# Patient Record
Sex: MALE | Race: WHITE | HISPANIC OR LATINO
[De-identification: names, ages, dates, MRNs, and addresses within clinical notes are randomized per-mention and may not be internally consistent; named-entity substitution may affect disease eponyms.]

---

## 2019-10-07 ENCOUNTER — NON-APPOINTMENT (OUTPATIENT)
Age: 74
End: 2019-10-07

## 2019-10-08 ENCOUNTER — APPOINTMENT (OUTPATIENT)
Dept: OPHTHALMOLOGY | Facility: CLINIC | Age: 74
End: 2019-10-08
Payer: MEDICARE

## 2019-10-08 PROCEDURE — 92004 COMPRE OPH EXAM NEW PT 1/>: CPT

## 2020-08-04 ENCOUNTER — APPOINTMENT (OUTPATIENT)
Dept: OPHTHALMOLOGY | Facility: CLINIC | Age: 75
End: 2020-08-04

## 2022-12-27 ENCOUNTER — NON-APPOINTMENT (OUTPATIENT)
Age: 77
End: 2022-12-27

## 2022-12-27 ENCOUNTER — APPOINTMENT (OUTPATIENT)
Dept: ORTHOPEDIC SURGERY | Facility: CLINIC | Age: 77
End: 2022-12-27

## 2022-12-27 VITALS — WEIGHT: 208 LBS | BODY MASS INDEX: 29.78 KG/M2 | HEIGHT: 70 IN

## 2022-12-27 DIAGNOSIS — M25.561 PAIN IN RIGHT KNEE: ICD-10-CM

## 2022-12-27 PROBLEM — Z00.00 ENCOUNTER FOR PREVENTIVE HEALTH EXAMINATION: Status: ACTIVE | Noted: 2022-12-27

## 2022-12-27 PROCEDURE — 73564 X-RAY EXAM KNEE 4 OR MORE: CPT | Mod: RT

## 2022-12-27 PROCEDURE — 99204 OFFICE O/P NEW MOD 45 MIN: CPT | Mod: 25

## 2022-12-27 PROCEDURE — 20610 DRAIN/INJ JOINT/BURSA W/O US: CPT | Mod: RT

## 2022-12-27 RX ORDER — HYALURONATE SODIUM 20 MG/2 ML
20 SYRINGE (ML) INTRAARTICULAR
Qty: 1 | Refills: 0 | Status: ACTIVE | COMMUNITY
Start: 2022-12-27

## 2022-12-27 NOTE — PROCEDURE
[de-identified] : Injection: Right knee joint.\par Indication: Degenerative osteoarthritis\par \par A discussion was had with the patient regarding this procedure and all questions were answered. All risks, benefits and alternatives were discussed. These included but were not limited to bleeding, infection, and allergic reaction.  Also discussed with the patient risk of transient hyperglycemia due to the patient's diabetes.  Alcohol was used to clean the skin, and betadine was used to sterilize and prep the area in the lateral aspect of the right knee. Ethyl chloride spray was then used as a topical anesthetic. A 22-gauge needle was used to inject 4cc of 2% lidocaine and 1cc of 40 mg Depo-Medrol into the knee. A sterile bandage was then applied. The patient tolerated the procedure well and there were no complications.\par

## 2022-12-27 NOTE — DISCUSSION/SUMMARY
[de-identified] : 77-year-old male with severe degenerative joint arthritis of the right knee.\par I discussed the treatment of degenerative arthritis with the patient at length today. I described the spectrum of treatment from nonoperative modalities to total joint arthroplasty. Noninvasive and nonoperative treatment modalities include weight reduction, activity modification with low impact exercise, PRN use of acetaminophen or anti-inflammatory medication if tolerated, glucosamine/chondroitin supplements, and physical therapy. Further treatments can include corticosteroid injection and the use of hyaluronic acid injections. Definitive treatment can certainly include total joint arthroplasty also. The risks and benefits of each treatment options was discussed and all questions were answered. \par Cortisone injections performed today as described in the note.  He was given a prescription for physical therapy.  We will obtain authorization for Euflexxa injections.  He will follow-up for the gel injections.\par

## 2022-12-27 NOTE — HISTORY OF PRESENT ILLNESS
[de-identified] : 77-year-old [place]ian-speaking male presents with 2-3 weeks of worsening right knee pain, not associated with specific injury or event.  He notices pain with increased walking along the medial and lateral aspects of his knee.  Has associated swelling and recent loss of motion.  Also sensation of buckling episodes due to the swelling.  Denies locking or clicking.  No issues with his right knee in the past and he is typically active with lots of walking during the day.  His son has noticed increased stiffness and limping.  They have applied ice and he has taken some NSAIDs with partial relief.  No history of right knee surgery or injections.  He underwent left knee arthroscopy in approximately 2015.  Denies distal numbness or tingling.\par Past medical history: Prostate cancer currently undergoing chemotherapy, hypertension, diabetes

## 2022-12-27 NOTE — PHYSICAL EXAM
[de-identified] : General: No acute distress\par Mental: Alert and oriented x3\par Eyes: Conjunctivitis not seen\par Chest: Symmetric chest rise, no audible wheezing\par Skin: Bilateral lower extremities absent from rashes and ulcers\par Abdomen: No distention\par \par Right knee:\par Skin: Clean, dry, intact \par Inspection: Slight varus malalignment, moderate effusion and soft tissue edema\par Tenderness: + MJLT. + LJLT. No tenderness over the medial and lateral patella facets. No ttp medial/lateral epicondyle, patella tendon, tibial tubercle, pes anserinus, or proximal fibula. \par ROM: 20 to 110° pain with deep flexion and crepitus\par Stability: Stable to varus and valgus, negative lachman. Negative anterior/posterior drawer. \par Additional tests: Negative McMurrays test, negative Steinmann, Negative patellar grind test.  \par Strength: 5/5 Q/H/TA/GS/EHL, no atrophy \par Neuro: Sensation intact to light touch throughout in dp/sp/tib/liang/saph nerve distributions\par Pulses: 2+ DP/PT pulses.\par  [de-identified] : The following radiographs were ordered and read by me during this patients visit. I reviewed each radiograph in detail with the patient and discussed the findings as highlighted below.\par \par Right knee x-rays demonstrate severe degenerative findings with narrowing of the medial lateral joint space, marginal osteophytes with subchondral sclerosis, chondrocalcinosis, neutral alignment.  Patella at appropriate height and position with significant narrowing of the patellofemoral joint space.  Kellgren-Jared 3

## 2023-01-09 ENCOUNTER — APPOINTMENT (OUTPATIENT)
Dept: ORTHOPEDIC SURGERY | Facility: CLINIC | Age: 78
End: 2023-01-09
Payer: MEDICARE

## 2023-01-09 VITALS — BODY MASS INDEX: 29.78 KG/M2 | HEIGHT: 70 IN | WEIGHT: 208 LBS

## 2023-01-09 DIAGNOSIS — M17.11 UNILATERAL PRIMARY OSTEOARTHRITIS, RIGHT KNEE: ICD-10-CM

## 2023-01-09 PROCEDURE — 99213 OFFICE O/P EST LOW 20 MIN: CPT

## 2023-01-09 NOTE — PHYSICAL EXAM
[de-identified] : General: No acute distress\par Mental: Alert and oriented x3\par Eyes: Conjunctivitis not seen\par Chest: Symmetric chest rise, no audible wheezing\par Skin: Bilateral lower extremities absent from rashes and ulcers\par Abdomen: No distention\par \par Right knee:\par Skin: Clean, dry, intact \par Inspection: Slight varus malalignment, moderate effusion and soft tissue edema\par Tenderness: + MJLT. + LJLT. No tenderness over the medial and lateral patella facets. No ttp medial/lateral epicondyle, patella tendon, tibial tubercle, pes anserinus, or proximal fibula. \par ROM: 10 to 120°, crepitus through the arc of motion\par Stability: Stable to varus and valgus, negative lachman. Negative anterior/posterior drawer. \par Additional tests: Negative McMurrays test, negative Steinmann, Negative patellar grind test.  \par Strength: 5/5 Q/H/TA/GS/EHL, no atrophy \par Neuro: Sensation intact to light touch throughout in dp/sp/tib/liang/saph nerve distributions\par Pulses: 2+ DP/PT pulses.\par \par Right hip: Tender at the buttock, nontender at the greater trochanter.  Hip flexion 110, external rotation 50, internal rotation 30.  Negative Stinchfield, negative JEROME, negative FADIR.\par Pain in the buttock reproduced with knee flexion against resistance. [de-identified] : Right knee x-rays from 12/27/2022 reviewed showing advanced degenerative joint arthritis of the right knee with joint space narrowing, subchondral sclerosis, marginal osteophytes.

## 2023-01-09 NOTE — DISCUSSION/SUMMARY
[de-identified] : 77-year-old male with advanced arthritis of the right knee, new onset right buttock and lateral thigh/leg pain.  Symptoms likely related to muscular buttock pain and exacerbation due to gait change and physical therapy.  He may have sustained a hamstring strain from exercise and physical therapy.  Recommend continued anti-inflammatories, stretching, physical therapy.  He is going on a trip to [facility] next week and will be doing a lot of walking.  I recommend that he stretch multiple times a day.  He would like to obtain gel injections to the right knee and we will obtain authorization for this.  Follow-up for the gel injections.

## 2023-01-09 NOTE — HISTORY OF PRESENT ILLNESS
[de-identified] : 77-year-old male returns for follow-up due to continued right knee pain secondary to advanced degenerative arthritis.  The steroid injection performed at the last visit gave him a few days of relief but the pain returned.  He continues to have lateral right knee pain on most days although today feels significantly better.  He started having right buttock pain over the past 5 days that intermittently radiates down the lateral aspect of his right thigh.  He denies any weakness in the lower extremities and has started with physical therapy, and has already done 3 sessions.  The buttock pain is worse with prolonged sitting.  He does have baseline numbness in some of his toes bilaterally secondary to diabetes, and no new numbness or tingling.  Has buttock pain seems to get worse after a session of physical therapy, may be related to muscular soreness.  He continues to take diclofenac for the pain.